# Patient Record
Sex: MALE | URBAN - METROPOLITAN AREA
[De-identification: names, ages, dates, MRNs, and addresses within clinical notes are randomized per-mention and may not be internally consistent; named-entity substitution may affect disease eponyms.]

---

## 2019-05-22 ENCOUNTER — OFFICE VISIT (OUTPATIENT)
Dept: URBAN - METROPOLITAN AREA CLINIC 76 | Facility: CLINIC | Age: 19
End: 2019-05-22
Payer: COMMERCIAL

## 2019-05-22 DIAGNOSIS — H52.13 MYOPIA, BILATERAL: Primary | ICD-10-CM

## 2019-05-22 PROCEDURE — 92002 INTRM OPH EXAM NEW PATIENT: CPT | Performed by: OPTOMETRIST

## 2019-05-22 PROCEDURE — 92012 INTRM OPH EXAM EST PATIENT: CPT | Performed by: OPTOMETRIST

## 2019-05-22 ASSESSMENT — INTRAOCULAR PRESSURE
OS: 12
OD: 12

## 2019-05-22 ASSESSMENT — KERATOMETRY
OD: 42.63
OS: 42.38

## 2019-05-22 ASSESSMENT — VISUAL ACUITY
OS: 20/20
OD: 20/20

## 2019-05-22 NOTE — IMPRESSION/PLAN
Impression: Myopia, bilateral: H52.13. OU. Plan: A glasses prescription has been discussed and generated. Patient to call with any concerns. Emphasized importance of annual dilation.